# Patient Record
(demographics unavailable — no encounter records)

---

## 2024-11-11 NOTE — PHYSICAL EXAM
[Normal Breath Sounds] : Normal breath sounds [Normal Rate and Rhythm] : normal rate and rhythm [2+] : left 2+ [Ankle Swelling (On Exam)] : present [Ankle Swelling Bilaterally] : bilaterally  [Ankle Swelling On The Right] : mild [No Rash or Lesion] : No rash or lesion [Alert] : alert [Calm] : calm [Varicose Veins Of Lower Extremities] : not present [] : not present [de-identified] : Appears well, no acute distress noted [de-identified] : No palpable cords.  No calf tenderness. [de-identified] : Intact

## 2024-11-11 NOTE — HISTORY OF PRESENT ILLNESS
[FreeTextEntry1] : Patient is a 57-year-old female with history significant for hypertension who presents to the office today for evaluation of left lower extremity discomfort.  Patient reports symptoms began 2 weeks ago and worsened with extended time in the sitting position.  Patient states she had a prior varicose vein procedure about 20 years ago.  Patient endorses current use of compression stockings.  Denies rest pain or claudication symptoms.  Denies tissue loss.  Denies fever or chills.  Denies chest pain or shortness of breath.  No history of MI or CVA.  No history of DVT or PE.  No history of smoking.

## 2024-11-11 NOTE — ASSESSMENT
[FreeTextEntry1] : 57-year-old female with left lower extremity discomfort.  Pedal pulses are intact bilaterally.  There is no evidence of significant arterial sufficiency at this time.  Suspect venous insufficiency.  Recommend compression and elevation.  Patient to return to office for venous duplex of left lower extremity.